# Patient Record
(demographics unavailable — no encounter records)

---

## 2024-11-19 NOTE — ASSESSMENT
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 Patient denies fever/chills/CP/SOB/calf pain. Pain well controlled on current regimen. X-rays revealing fracture and hardware in proper position and alignment with increased callus formation at fracture site, fracture is fully healed -RLE WBAT, d/c cam boot -Continue PT/OT for gait training/transfers/ROM exercises, rx given today -Activities as tolerated, may begin to progress to her tolerance -Rest, ice, compression, elevation, NSAIDs PRN for pain.  -All questions answered -F/u 3 months  The diagnosis was explained in detail. The potential non-surgical and surgical treatments were reviewed. The relative risks and benefits of each option were considered relative to the patients age, activity level, medical history, symptom severity and previously attempted treatments.  The patient was advised to consult with their primary medical provider prior to initiation of any new medications to reduce the risk of adverse effects specific to their long-term home medications and medical history. The risk of gastrointestinal irritation and kidney injury specific to long-term NSAID use was discussed.  Entered by Bryan Lopez PA-C acting as scribe. Dr. Banuelos Attestation The documentation recorded by the scribe, in my presence, accurately reflects the service I, Dr. Banuelos, personally performed, and the decisions made by me with my edits as appropriate.

## 2024-11-19 NOTE — HISTORY OF PRESENT ILLNESS
[Sudden] : sudden [0] : 0 [Intermittent] : intermittent [Household chores] : household chores [Leisure] : leisure [Social interactions] : social interactions [Rest] : rest [Full time] : Work status: full time [3] : 3 [Sharp] : sharp [de-identified] : Patient is here today for her right ankle. 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 Patient states that pain is improving, notes intermittent stabbing pain with ROM. Patient came into office weight bearing in cam boot. Patient states she stopped going to PT 10/1/24 due to insurance but kept doing a HEP.   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

## 2024-11-19 NOTE — PHYSICAL EXAM
[de-identified] : Examination of the right foot and ankle is as follows: INSPECTION: incision well healed without erythema, mild lateral malleolus swelling PALPATION: TTP over anterior ankle joint line, no lateral malleolus tenderness ROM: dorsiflexion: -3 degrees, plantarflexion: 20 degrees, inversion: 15 degrees, eversion: 10 degrees STRENGTH: dorsiflexion 4/5, plantar flexion 4/5, inversion 4/5, eversion 4/5, EHL 5/5, FHL 5/5 VASCULAR: DP 1+  NEURO: Sensation present to light touch in all distributions GAIT: RLE short cam boot, patient ambulates without assistive devices  X-rays of the right ankle is as follows:  Ankle 3 view: s/p ORIF hardware and fracture in proper position and alignment without evidence of hardware failure, no medial clear space widening, increased callus formation at fracture site, fracture is fully healed